# Patient Record
Sex: MALE | Race: WHITE | NOT HISPANIC OR LATINO | Employment: FULL TIME | URBAN - METROPOLITAN AREA
[De-identification: names, ages, dates, MRNs, and addresses within clinical notes are randomized per-mention and may not be internally consistent; named-entity substitution may affect disease eponyms.]

---

## 2020-02-05 ENCOUNTER — RECORDS - HEALTHEAST (OUTPATIENT)
Dept: LAB | Facility: CLINIC | Age: 50
End: 2020-02-05

## 2020-02-05 LAB
ANION GAP SERPL CALCULATED.3IONS-SCNC: 9 MMOL/L (ref 5–18)
BUN SERPL-MCNC: 12 MG/DL (ref 8–22)
CALCIUM SERPL-MCNC: 9.4 MG/DL (ref 8.5–10.5)
CHLORIDE BLD-SCNC: 102 MMOL/L (ref 98–107)
CHOLEST SERPL-MCNC: 186 MG/DL
CO2 SERPL-SCNC: 27 MMOL/L (ref 22–31)
CREAT SERPL-MCNC: 1.14 MG/DL (ref 0.7–1.3)
FASTING STATUS PATIENT QL REPORTED: NORMAL
GFR SERPL CREATININE-BSD FRML MDRD: >60 ML/MIN/1.73M2
GLUCOSE BLD-MCNC: 187 MG/DL (ref 70–125)
HDLC SERPL-MCNC: 46 MG/DL
LDLC SERPL CALC-MCNC: 116 MG/DL
POTASSIUM BLD-SCNC: 4.5 MMOL/L (ref 3.5–5)
SODIUM SERPL-SCNC: 138 MMOL/L (ref 136–145)
TRIGL SERPL-MCNC: 120 MG/DL

## 2020-03-04 ENCOUNTER — RECORDS - HEALTHEAST (OUTPATIENT)
Dept: LAB | Facility: CLINIC | Age: 50
End: 2020-03-04

## 2020-03-04 LAB
ANION GAP SERPL CALCULATED.3IONS-SCNC: 9 MMOL/L (ref 5–18)
BUN SERPL-MCNC: 11 MG/DL (ref 8–22)
CALCIUM SERPL-MCNC: 9.7 MG/DL (ref 8.5–10.5)
CHLORIDE BLD-SCNC: 102 MMOL/L (ref 98–107)
CO2 SERPL-SCNC: 28 MMOL/L (ref 22–31)
CREAT SERPL-MCNC: 1.15 MG/DL (ref 0.7–1.3)
GFR SERPL CREATININE-BSD FRML MDRD: >60 ML/MIN/1.73M2
GLUCOSE BLD-MCNC: 122 MG/DL (ref 70–125)
POTASSIUM BLD-SCNC: 4.4 MMOL/L (ref 3.5–5)
SODIUM SERPL-SCNC: 139 MMOL/L (ref 136–145)

## 2020-08-05 ENCOUNTER — RECORDS - HEALTHEAST (OUTPATIENT)
Dept: LAB | Facility: CLINIC | Age: 50
End: 2020-08-05

## 2020-08-05 LAB
ANION GAP SERPL CALCULATED.3IONS-SCNC: 10 MMOL/L (ref 5–18)
BUN SERPL-MCNC: 12 MG/DL (ref 8–22)
CALCIUM SERPL-MCNC: 9.7 MG/DL (ref 8.5–10.5)
CHLORIDE BLD-SCNC: 100 MMOL/L (ref 98–107)
CO2 SERPL-SCNC: 28 MMOL/L (ref 22–31)
CREAT SERPL-MCNC: 1.16 MG/DL (ref 0.7–1.3)
GFR SERPL CREATININE-BSD FRML MDRD: >60 ML/MIN/1.73M2
GLUCOSE BLD-MCNC: 163 MG/DL (ref 70–125)
POTASSIUM BLD-SCNC: 4.5 MMOL/L (ref 3.5–5)
SODIUM SERPL-SCNC: 138 MMOL/L (ref 136–145)

## 2020-08-14 ENCOUNTER — RECORDS - HEALTHEAST (OUTPATIENT)
Dept: LAB | Facility: CLINIC | Age: 50
End: 2020-08-14

## 2020-08-14 LAB — HBA1C MFR BLD: 5.5 %

## 2021-05-07 ENCOUNTER — RECORDS - HEALTHEAST (OUTPATIENT)
Dept: LAB | Facility: CLINIC | Age: 51
End: 2021-05-07

## 2021-05-07 LAB
ANION GAP SERPL CALCULATED.3IONS-SCNC: 11 MMOL/L (ref 5–18)
BUN SERPL-MCNC: 13 MG/DL (ref 8–22)
CALCIUM SERPL-MCNC: 9.1 MG/DL (ref 8.5–10.5)
CHLORIDE BLD-SCNC: 103 MMOL/L (ref 98–107)
CHOLEST SERPL-MCNC: 199 MG/DL
CO2 SERPL-SCNC: 27 MMOL/L (ref 22–31)
CREAT SERPL-MCNC: 1.23 MG/DL (ref 0.7–1.3)
FASTING STATUS PATIENT QL REPORTED: ABNORMAL
GFR SERPL CREATININE-BSD FRML MDRD: >60 ML/MIN/1.73M2
GLUCOSE BLD-MCNC: 122 MG/DL (ref 70–125)
HDLC SERPL-MCNC: 40 MG/DL
LDLC SERPL CALC-MCNC: 120 MG/DL
POTASSIUM BLD-SCNC: 5.1 MMOL/L (ref 3.5–5)
SODIUM SERPL-SCNC: 141 MMOL/L (ref 136–145)
TRIGL SERPL-MCNC: 195 MG/DL

## 2022-01-10 ENCOUNTER — LAB REQUISITION (OUTPATIENT)
Dept: LAB | Facility: CLINIC | Age: 52
End: 2022-01-10

## 2022-01-10 DIAGNOSIS — I10 ESSENTIAL (PRIMARY) HYPERTENSION: ICD-10-CM

## 2022-01-10 PROCEDURE — 82310 ASSAY OF CALCIUM: CPT | Performed by: NURSE PRACTITIONER

## 2022-01-11 LAB
ANION GAP SERPL CALCULATED.3IONS-SCNC: 11 MMOL/L (ref 5–18)
BUN SERPL-MCNC: 10 MG/DL (ref 8–22)
CALCIUM SERPL-MCNC: 9.5 MG/DL (ref 8.5–10.5)
CHLORIDE BLD-SCNC: 103 MMOL/L (ref 98–107)
CO2 SERPL-SCNC: 25 MMOL/L (ref 22–31)
CREAT SERPL-MCNC: 1.11 MG/DL (ref 0.7–1.3)
GFR SERPL CREATININE-BSD FRML MDRD: 80 ML/MIN/1.73M2
GLUCOSE BLD-MCNC: 75 MG/DL (ref 70–125)
POTASSIUM BLD-SCNC: 4.6 MMOL/L (ref 3.5–5)
SODIUM SERPL-SCNC: 139 MMOL/L (ref 136–145)

## 2022-12-22 ENCOUNTER — APPOINTMENT (OUTPATIENT)
Dept: RADIOLOGY | Facility: HOSPITAL | Age: 52
End: 2022-12-22
Payer: COMMERCIAL

## 2022-12-22 ENCOUNTER — HOSPITAL ENCOUNTER (EMERGENCY)
Facility: HOSPITAL | Age: 52
Discharge: HOME | End: 2022-12-22
Attending: EMERGENCY MEDICINE | Admitting: EMERGENCY MEDICINE
Payer: COMMERCIAL

## 2022-12-22 VITALS
HEIGHT: 72 IN | BODY MASS INDEX: 29.53 KG/M2 | HEART RATE: 67 BPM | OXYGEN SATURATION: 96 % | TEMPERATURE: 97.8 F | DIASTOLIC BLOOD PRESSURE: 88 MMHG | SYSTOLIC BLOOD PRESSURE: 138 MMHG | RESPIRATION RATE: 16 BRPM | WEIGHT: 218 LBS

## 2022-12-22 DIAGNOSIS — S29.011A MUSCLE STRAIN OF CHEST WALL, INITIAL ENCOUNTER: ICD-10-CM

## 2022-12-22 DIAGNOSIS — R12 HEARTBURN: ICD-10-CM

## 2022-12-22 DIAGNOSIS — R07.9 CHEST PAIN: ICD-10-CM

## 2022-12-22 LAB
ANION GAP SERPL CALCULATED.3IONS-SCNC: 13 MMOL/L (ref 7–15)
BUN SERPL-MCNC: 14.3 MG/DL (ref 6–20)
CALCIUM SERPL-MCNC: 10.5 MG/DL (ref 8.6–10)
CHLORIDE SERPL-SCNC: 100 MMOL/L (ref 98–107)
CREAT SERPL-MCNC: 1.04 MG/DL (ref 0.67–1.17)
DEPRECATED HCO3 PLAS-SCNC: 26 MMOL/L (ref 22–29)
ERYTHROCYTE [DISTWIDTH] IN BLOOD BY AUTOMATED COUNT: 12.4 % (ref 10–15)
GFR SERPL CREATININE-BSD FRML MDRD: 87 ML/MIN/1.73M2
GLUCOSE SERPL-MCNC: 153 MG/DL (ref 70–99)
HCT VFR BLD AUTO: 43.3 % (ref 40–53)
HGB BLD-MCNC: 15.4 G/DL (ref 13.3–17.7)
HOLD SPECIMEN: NORMAL
MCH RBC QN AUTO: 31.8 PG (ref 26.5–33)
MCHC RBC AUTO-ENTMCNC: 35.6 G/DL (ref 31.5–36.5)
MCV RBC AUTO: 90 FL (ref 78–100)
PLATELET # BLD AUTO: 264 10E3/UL (ref 150–450)
POTASSIUM SERPL-SCNC: 3.9 MMOL/L (ref 3.4–5.3)
RBC # BLD AUTO: 4.84 10E6/UL (ref 4.4–5.9)
SODIUM SERPL-SCNC: 139 MMOL/L (ref 136–145)
TROPONIN T SERPL HS-MCNC: 9 NG/L
WBC # BLD AUTO: 5.7 10E3/UL (ref 4–11)

## 2022-12-22 PROCEDURE — 250N000009 HC RX 250

## 2022-12-22 PROCEDURE — 93005 ELECTROCARDIOGRAM TRACING: CPT | Performed by: EMERGENCY MEDICINE

## 2022-12-22 PROCEDURE — 99285 EMERGENCY DEPT VISIT HI MDM: CPT | Mod: 25

## 2022-12-22 PROCEDURE — 96374 THER/PROPH/DIAG INJ IV PUSH: CPT

## 2022-12-22 PROCEDURE — 85027 COMPLETE CBC AUTOMATED: CPT

## 2022-12-22 PROCEDURE — 82310 ASSAY OF CALCIUM: CPT

## 2022-12-22 PROCEDURE — 84484 ASSAY OF TROPONIN QUANT: CPT

## 2022-12-22 PROCEDURE — 250N000011 HC RX IP 250 OP 636

## 2022-12-22 PROCEDURE — 71046 X-RAY EXAM CHEST 2 VIEWS: CPT

## 2022-12-22 PROCEDURE — 250N000013 HC RX MED GY IP 250 OP 250 PS 637

## 2022-12-22 PROCEDURE — 36415 COLL VENOUS BLD VENIPUNCTURE: CPT

## 2022-12-22 RX ORDER — FAMOTIDINE 20 MG/1
20 TABLET, FILM COATED ORAL 2 TIMES DAILY
Qty: 30 TABLET | Refills: 0 | Status: SHIPPED | OUTPATIENT
Start: 2022-12-22

## 2022-12-22 RX ADMIN — ALUMINUM HYDROXIDE, MAGNESIUM HYDROXIDE, AND DIMETHICONE 30 ML: 200; 20; 200 SUSPENSION ORAL at 17:27

## 2022-12-22 RX ADMIN — FAMOTIDINE 20 MG: 10 INJECTION, SOLUTION INTRAVENOUS at 17:27

## 2022-12-22 ASSESSMENT — ENCOUNTER SYMPTOMS
VOMITING: 0
NUMBNESS: 0
DYSURIA: 0
LIGHT-HEADEDNESS: 0
DIZZINESS: 0
HEADACHES: 0
BACK PAIN: 0
NECK PAIN: 0
SHORTNESS OF BREATH: 1
CHEST TIGHTNESS: 1
CHILLS: 0
FEVER: 0
PALPITATIONS: 0
FATIGUE: 1
ABDOMINAL PAIN: 0
NAUSEA: 0
COUGH: 0
WEAKNESS: 0

## 2022-12-22 ASSESSMENT — ACTIVITIES OF DAILY LIVING (ADL): ADLS_ACUITY_SCORE: 35

## 2022-12-22 NOTE — ED NOTES
"ED Triage Provider Note  [facility]  Encounter Date: Dec 22, 2022    History:  No chief complaint on file.    Gustavo Miguel is a 51 year old male who presents to the ED with chest tightness.  He was at work as a .  They have had increased lifting/workload and feels like he may have strained a muscle in his chest.  He was stretching at work and his boss wanted him to have his chest pain checked out.  Then, will get a little twinge occasionally. Has htn.  Also incidentally got his shingles vaccine Sunday and has had left arm aching since then.      He went to urgent care, says they did ekg which was \"normal\" and was told to come here.    Review of Systems:  Review of Systems   Chest discomfort, no gi upset    Exam:  There were no vitals taken for this visit.  General: No acute distress. Appears stated age.   Cardio: Regular rate, extremities well perfused  Resp: Normal work of breathing, grossly normal respiratory rate  Neuro: Alert. CN II-XII grossly intact. Grossly intact strength.         Medical Decision Making:  Patient arriving to the ED with problem as above. A medical screening exam was performed. ekg orders initiated from Triage. The patient is appropriate to wait in triage.     Chest pain that has been present a couple of days.  Also left arm pain since shingles shot.  Differential of chest pain includes ischemia or muscle strain but needs further ER workup    The patient will be seen in the ED for final evaluation and disposition.     Kindra Dennison on 12/22/2022 at 3:47 PM      Lab/Imaging Results:       Interventions:  Medications - No data to display    Diagnosis:  No diagnosis found.        Kindra Dennison MD  Emergency Medicine  [facility] EMERGENCY DEPARTMENT         Kindra Dennison MD  12/22/22 1012    "

## 2022-12-22 NOTE — DISCHARGE INSTRUCTIONS
Please bring this paperwork with you to your follow-up appointment.    You were seen in the urgent care/emergency department for chest pain and tightness.     Your work-up here today looked good, we do not think that this is a heart attack or other problems with your heart.  You had an EKG and a chest x-ray as well as blood work done to evaluate for your heart and these all look good.  We suspect this is a muscle strain.    For your symptoms:    Tylenol/ibuprofen as needed  You may take up to 650 mg of Tylenol (acetaminophen) up to 4 times daily and up to 600 mg of ibuprofen up to 4 times daily as needed for fever, pain.  Please do not take more than the daily maximum recommended dose (tylenol = 4 grams, ibuprofen = 2.4 grams) as it can cause harm to your liver, kidneys, stomach.  It is best to take ibuprofen with food. Please read labels of any over-the-counter medicine you may be taking as it may contain Tylenol (acetaminophen) or Advil (ibuprofen).     Heating pads to the chest as needed.    Take pepcid twice a day for heart burn and follow up with your primary care provider for ongoing management of acid reflux.    Follow up with your primary care provider for recheck in 3 days for ER follow up.     Return to the emergency department if you develop worsening chest pain, shortness of breath, fevers, vomiting, or any other new worsening or concerning symptoms. We'd be happy to see you again.    Thank you for allowing us to be part of your care today.    Take care!  -Ronit Cardoso PA-C

## 2022-12-22 NOTE — Clinical Note
Gustavo Miguel was seen and treated in our emergency department on 12/22/2022.  He may return to work on 12/23/2022.       If you have any questions or concerns, please don't hesitate to call.      Ronit Cardoso PA-C

## 2022-12-22 NOTE — ED PROVIDER NOTES
"Emergency Department Midlevel Supervisory Note     I personally saw the patient and performed a substantive portion of the visit including all aspects of the medical decision making.    ED Course:  4:40 PM Ronit Cardoso PA-C staffed patient with me. I agree with their assessment and plan of management, and I will see the patient.  5:22 PM PM I met with the patient to introduce myself, gather additional history, perform my initial exam, and discuss the plan.     Brief HPI:     Gustavo Miguel is a 51 year old male who presents for evaluation of chest pain.    Patient reports 1 week history of left-sided chest pain.  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  Has had intermittent left-sided chest pain in the past, but symptoms started worsening this past week when a male got more heavy with the holiday season.  Reports pain is more like \"muscle tightness\" and he thinks he pulled a muscle rather than heart pain.  Pain is worsened with deep breathing and twisting movements.  Pain is not worsened by pushing on his chest.  Has occasional shortness of breath and fatigue with walking, which is new over the past week as well.  Patient denies pain at rest.  Has not taken Tylenol or ibuprofen for the pain.  Presented to an urgent care today after his boss encouraged him to be evaluated as the boss has had history of heart attacks and was concerned for the patient.  At the urgent care, they did an EKG and given Tylenol and sent here for further work-up.  Patient reports he is unsure why he is here and is frustrated that he was told to come here.  Denies pain currently.  Denies diaphoresis when pain starts.  Reports he will get intermittent \"twinges of pain\" when watching TV over the past week.  Denies nausea or vomiting.  Patient thinks his pain is more from heartburn, does not take anything for this.  No personal history of cardiac complaints.  No family history of heart disease that he knows " of.  Does have high blood pressure and uses smokeless tobacco daily.  Denies fevers, chills, recent sick contacts, headache, abdominal pain, diarrhea, constipation, back pain or other symptoms at this time.    I, [place] Tobar, am serving as a scribe to document services personally performed by Dr. Chacorta Montoya, based on my observation and the provider's statements to me. I, Chacorta Montoya MD attest that Donaldlauriemariano Amadou is acting in a scribe capacity, has observed my performance of the services and has documented them in accordance with my direction.    Brief Physical Exam: BP (!) 163/97   Pulse 85   Temp 97.8  F (36.6  C) (Oral)   Resp 18   Ht 1.829 m (6')   Wt 98.9 kg (218 lb)   SpO2 99%   BMI 29.57 kg/m    Constitutional:  Alert, in no acute distress  EYES: Conjunctivae clear  HENT:  Atraumatic, normocephalic  Respiratory:  Respirations even, unlabored, in no acute respiratory distress  Cardiovascular:  Regular rate and rhythm, good peripheral perfusion  GI: Soft, nondistended, nontender, no palpable masses, no rebound, no guarding   Musculoskeletal:  No edema. No cyanosis. Range of motion major extremities intact.    Integument: Warm, Dry, No erythema, No rash.   Neurologic:  Alert & oriented, no focal deficits noted  Psych: Normal mood and affect     MDM:  ***       1. Chest pain    2. Muscle strain of chest wall, initial encounter        Labs and Imaging:  Results for orders placed or performed during the hospital encounter of 12/22/22   Chest XR,  PA & LAT    Impression    IMPRESSION: Negative chest.   CBC (+ platelets, no diff)   Result Value Ref Range    WBC Count 5.7 4.0 - 11.0 10e3/uL    RBC Count 4.84 4.40 - 5.90 10e6/uL    Hemoglobin 15.4 13.3 - 17.7 g/dL    Hematocrit 43.3 40.0 - 53.0 %    MCV 90 78 - 100 fL    MCH 31.8 26.5 - 33.0 pg    MCHC 35.6 31.5 - 36.5 g/dL    RDW 12.4 10.0 - 15.0 %    Platelet Count 264 150 - 450 10e3/uL   Basic metabolic panel   Result Value Ref  Range    Sodium 139 136 - 145 mmol/L    Potassium 3.9 3.4 - 5.3 mmol/L    Chloride 100 98 - 107 mmol/L    Carbon Dioxide (CO2) 26 22 - 29 mmol/L    Anion Gap 13 7 - 15 mmol/L    Urea Nitrogen 14.3 6.0 - 20.0 mg/dL    Creatinine 1.04 0.67 - 1.17 mg/dL    Calcium 10.5 (H) 8.6 - 10.0 mg/dL    Glucose 153 (H) 70 - 99 mg/dL    GFR Estimate 87 >60 mL/min/1.73m2   Troponin T, High Sensitivity (now)   Result Value Ref Range    Troponin T, High Sensitivity 9 <=22 ng/L     I have reviewed the relevant laboratory and radiology studies    Procedures:  I was present for the key portions of this procedure: ***none    Chacorta Montoya MD  [facility] EMERGENCY DEPARTMENT  [address] [place] [id number] [phone]

## 2022-12-22 NOTE — ED TRIAGE NOTES
Pt went to  and sent here. Pt felt tightness across his chest. Works at the [employer] has been doing more strenuous activity.

## 2022-12-22 NOTE — ED PROVIDER NOTES
EMERGENCY DEPARTMENT ENCOUNTER      NAME: Gustavo Miguel  AGE: 51 year old male  YOB: 1970  MRN: [id number]  EVALUATION DATE & TIME: 12/22/2022  3:51 PM    PCP: No primary care provider on file.    ED PROVIDER: Ronit Cardoso PA-C    Chief Complaint   Patient presents with     Chest Pain     FINAL IMPRESSION:  1. Chest pain    2. Muscle strain of chest wall, initial encounter    3. Heartburn      MEDICAL DECISION MAKING:    Pertinent Labs & Imaging studies reviewed. (See chart for details)  Gustavo Miguel is a 51 year old male who presents for evaluation of 1 week history of intermittent chest tightness.  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  Was advised by his boss to be evaluated.  Intermittent shortness of breath with walking over the past week.  No personal cardiac history.  Uses smokeless tobacco daily and has a history of high blood pressure..     On my initial evaluation, patient mildly hypertensive, but remainder of vital signs normal. On physical exam patient is awake, alert, no acute distress.  Is not uncomfortable, ill or toxic appearing.  Heart sounds are normal without murmurs or extra beats, lungs are clear without wheezing or crackles.  Chest is nontender on palpation.  No abdominal tenderness on palpation..    Differential diagnosis includes muscle strain, ACS, costochondritis, PE, GERD, esophageal rupture, PTX. Emergency department workup included basic blood work and in addition to troponin, EKG and chest x-ray. Patient was given GI cocktail and pepcid with some improvement in symptoms.     Suspect this is likely muscular in nature.  Vitals and exam otherwise reassuring today.  Troponin normal, chest x-ray and EKG normal and reassuring.  Symptoms have been going on for 1 week, with mild troponin and EKG, low suspicion for ACS.  Do think there is a component of GERD as well, patient reports feeling improved  after GI cocktail and Pepcid, so we will send home with Pepcid and advised him to follow-up with his regular doctor for ongoing management of GERD.  Low suspicion for PE or esophageal rupture based on vitals and exam, chest x-ray without pneumothorax or other cardiac or lung changes.  Patient verbalized being ready to be discharged home, low suspicion for any emergent process that would require further intervention or hospitalization at this point.  Provided strict return precautions to the emergency department for any worsening chest pain or shortness of breath.    Patient has had serial examinations and notes significant improvement.     Patient was discharged in stable condition with treatment plan as below. Instructed to follow up with primary care provider in 3 days and return to the emergency department with any new or worsening of symptoms. Patient expressed understanding, feels comfortable, and is in agreement with this plan. All questions addressed prior to discharge.    Medical Decision Making    Supplemental history from: Documented in HPI, if applicable    External Record(s) Reviewed: Documented in HPI, if applicable.    Differential Diagnosis: See MDM charting for differential considered.     I performed an independent interpretation of the: EKG: See my documentation.    Discussed with radiology regarding test interpretation: N/A    Discussion of management with another provider: See chart documentation, if applicable    The following testing was considered but ultimately not selected: None     I considered prescription management with: N/A    The patient's care impacted: N/A    Consideration of Admission/Observation: N/A - Patient discharged without consideration for admission    Care significantly affected by Social Determinants of Health including: N/A    ED COURSE:  4:11 PM  I reviewed the patient's chart. I met with the patient to gather history and to perform my initial exam.    I wore appropriate  "PPE during this encounter including: facemask & eye protection   4:39 PM I staffed this patient case with Dr. Montoya who agrees with plan at this time and will see the patient for their history, exam, and complete evaluation.  5:33 PM  I rechecked the patient and updated him on results. We discussed plan for discharge including treatment plan, follow-up and return precautions to emergency department.  Patient voiced understanding and in agreement with this plan.    At the conclusion of the encounter I discussed the results of all of the tests and the disposition. The questions were answered. The patient or family acknowledged understanding and was agreeable with the care plan.     MEDICATIONS GIVEN IN THE EMERGENCY:  Medications   lidocaine (viscous) (XYLOCAINE) 2 % 15 mL, alum & mag hydroxide-simethicone (MAALOX) 15 mL GI Cocktail (30 mLs Oral Given 12/22/22 1727)   famotidine (PEPCID) injection 20 mg (20 mg Intravenous Given 12/22/22 1727)     NEW PRESCRIPTIONS STARTED AT TODAY'S ER VISIT  New Prescriptions    FAMOTIDINE (PEPCID) 20 MG TABLET    Take 1 tablet (20 mg) by mouth 2 times daily     =================================================================    HPI:    Patient information was obtained from: Patient    Use of Interpretor: N/A     Gustavo Miguel is a 51 year old male with a pertinent history of HTN, HLD, smokeless tobacco use who presents to this ED for evaluation of chest pain.     Patient reports 1 week history of left-sided chest pain.  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  Has had intermittent left-sided chest pain in the past, but symptoms started worsening this past week when a male got more heavy with the holiday season.  Reports pain is more like \"muscle tightness\" and he thinks he pulled a muscle rather than heart pain.  Pain is worsened with deep breathing and twisting movements.  Pain is not worsened by pushing on his chest.  Has occasional shortness " "of breath and fatigue with walking, which is new over the past week as well.  Patient denies pain at rest.  Has not taken Tylenol or ibuprofen for the pain.  Presented to an urgent care today after his boss encouraged him to be evaluated as the boss has had history of heart attacks and was concerned for the patient.  At the urgent care, they did an EKG and given Tylenol and sent here for further work-up.  Patient reports he is unsure why he is here and is frustrated that he was told to come here.  Denies pain currently.  Denies diaphoresis when pain starts.  Reports he will get intermittent \"twinges of pain\" when watching TV over the past week.  Denies nausea or vomiting.  Patient thinks his pain is more from heartburn, does not take anything for this.  No personal history of cardiac complaints.  No family history of heart disease that he knows of.  Does have high blood pressure and uses smokeless tobacco daily.  Denies fevers, chills, recent sick contacts, headache, abdominal pain, diarrhea, constipation, back pain or other symptoms at this time.    REVIEW OF SYSTEMS:  Review of Systems   Constitutional: Positive for fatigue. Negative for chills and fever.   HENT: Negative for sneezing.    Respiratory: Positive for chest tightness and shortness of breath (occasional). Negative for cough.    Cardiovascular: Positive for chest pain. Negative for palpitations and leg swelling.   Gastrointestinal: Negative for abdominal pain, nausea and vomiting.   Genitourinary: Negative for dysuria.   Musculoskeletal: Negative for back pain and neck pain.   Neurological: Negative for dizziness, weakness, light-headedness, numbness and headaches.   All other systems reviewed and are negative.     PAST MEDICAL HISTORY:  No past medical history on file.    PAST SURGICAL HISTORY:  No past surgical history on file.    CURRENT MEDICATIONS:    No current facility-administered medications for this encounter.    Current Outpatient " Medications:      famotidine (PEPCID) 20 MG tablet, Take 1 tablet (20 mg) by mouth 2 times daily, Disp: 30 tablet, Rfl: 0    ALLERGIES:  No Known Allergies    FAMILY HISTORY:  Family History   Problem Relation Age of Onset     Diabetes Mother      Hypertension Mother      Hypertension Father        SOCIAL HISTORY:   Social History     Socioeconomic History     Marital status: Single       VITALS:  Patient Vitals for the past 24 hrs:   BP Temp Temp src Pulse Resp SpO2 Height Weight   12/22/22 1736 138/88 -- -- 67 -- 96 % -- --   12/22/22 1700 124/76 -- -- 69 16 95 % -- --   12/22/22 1600 135/80 -- -- 77 21 98 % -- --   12/22/22 1549 (!) 163/97 97.8  F (36.6  C) Oral 85 18 99 % 1.829 m (6') 98.9 kg (218 lb)       PHYSICAL EXAM    Constitutional: Well developed, Well nourished, NAD, not uncomfortable, ill or toxic appearing  HENT: Normocephalic, Atraumatic, Bilateral external ears normal, Oropharynx normal, mucous membranes moist, Nose normal.   Neck: Normal range of motion, No tenderness, Supple, No stridor.  Eyes: PERRL, EOMI, Conjunctiva normal, No discharge.   Respiratory: Normal breath sounds, No respiratory distress, No wheezing or crackles, Speaks full sentences easily. No cough.  Cardiovascular: Normal heart rate, Regular rhythm, No murmurs, No rubs, No gallops. Chest wall nontender.  GI: Soft, No tenderness, No masses, No flank tenderness. No rebound or guarding.  Musculoskeletal: 2+ DP pulses. No edema. No cyanosis, No clubbing. Good range of motion in all major joints. No tenderness to palpation or major deformities noted. No tenderness of the CTLS spine.   Integument: Warm, Dry, No erythema, No rash. No petechiae.  Neurologic: Alert & oriented x 3, Normal motor function, Normal sensory function, No focal deficits noted. Normal gait.  Psychiatric: Affect normal, Judgment normal, Mood normal. Cooperative.    LAB:  All pertinent labs reviewed and interpreted.  Labs Ordered and Resulted from Time of ED Arrival  to Time of ED Departure   BASIC METABOLIC PANEL - Abnormal       Result Value    Sodium 139      Potassium 3.9      Chloride 100      Carbon Dioxide (CO2) 26      Anion Gap 13      Urea Nitrogen 14.3      Creatinine 1.04      Calcium 10.5 (*)     Glucose 153 (*)     GFR Estimate 87     CBC WITH PLATELETS - Normal    WBC Count 5.7      RBC Count 4.84      Hemoglobin 15.4      Hematocrit 43.3      MCV 90      MCH 31.8      MCHC 35.6      RDW 12.4      Platelet Count 264     TROPONIN T, HIGH SENSITIVITY - Normal    Troponin T, High Sensitivity 9         RADIOLOGY:  Reviewed all pertinent imaging. Please see official radiology report.  Chest XR,  PA & LAT   Final Result   IMPRESSION: Negative chest.          EKG:    Performed at: 1601  Rate: 75bpm   Impression: sinus rhythm    I have reviewed and interpreted the EKG(s) documented above along with Dr. Jack.    PROCEDURES:   None    Diagnosis:  1. Chest pain    2. Muscle strain of chest wall, initial encounter    3. Heartburn         Ronit Cardoso PA-C  Emergency Medicine  [facility]  12/22/2022       Ronit Cardoso PA-C  12/22/22 5887

## 2023-06-26 ENCOUNTER — LAB REQUISITION (OUTPATIENT)
Dept: LAB | Facility: CLINIC | Age: 53
End: 2023-06-26

## 2023-06-26 DIAGNOSIS — I10 ESSENTIAL (PRIMARY) HYPERTENSION: ICD-10-CM

## 2023-06-26 DIAGNOSIS — Z13.220 ENCOUNTER FOR SCREENING FOR LIPOID DISORDERS: ICD-10-CM

## 2023-06-26 DIAGNOSIS — Z12.5 ENCOUNTER FOR SCREENING FOR MALIGNANT NEOPLASM OF PROSTATE: ICD-10-CM

## 2023-06-26 LAB
ANION GAP SERPL CALCULATED.3IONS-SCNC: 14 MMOL/L (ref 7–15)
BUN SERPL-MCNC: 16.2 MG/DL (ref 6–20)
CALCIUM SERPL-MCNC: 9.8 MG/DL (ref 8.6–10)
CHLORIDE SERPL-SCNC: 101 MMOL/L (ref 98–107)
CHOLEST SERPL-MCNC: 225 MG/DL
CREAT SERPL-MCNC: 1.08 MG/DL (ref 0.67–1.17)
DEPRECATED HCO3 PLAS-SCNC: 24 MMOL/L (ref 22–29)
GFR SERPL CREATININE-BSD FRML MDRD: 83 ML/MIN/1.73M2
GLUCOSE SERPL-MCNC: 117 MG/DL (ref 70–99)
HDLC SERPL-MCNC: 47 MG/DL
LDLC SERPL CALC-MCNC: 157 MG/DL
NONHDLC SERPL-MCNC: 178 MG/DL
POTASSIUM SERPL-SCNC: 4.7 MMOL/L (ref 3.4–5.3)
PSA SERPL DL<=0.01 NG/ML-MCNC: 0.02 NG/ML (ref 0–3.5)
SODIUM SERPL-SCNC: 139 MMOL/L (ref 136–145)
TRIGL SERPL-MCNC: 107 MG/DL

## 2023-06-26 PROCEDURE — 82310 ASSAY OF CALCIUM: CPT | Performed by: FAMILY MEDICINE

## 2023-06-26 PROCEDURE — 80061 LIPID PANEL: CPT | Performed by: FAMILY MEDICINE

## 2023-06-26 PROCEDURE — G0103 PSA SCREENING: HCPCS | Performed by: FAMILY MEDICINE
